# Patient Record
Sex: FEMALE | Race: WHITE | NOT HISPANIC OR LATINO | URBAN - METROPOLITAN AREA
[De-identification: names, ages, dates, MRNs, and addresses within clinical notes are randomized per-mention and may not be internally consistent; named-entity substitution may affect disease eponyms.]

---

## 2018-03-26 ENCOUNTER — APPOINTMENT (RX ONLY)
Dept: URBAN - METROPOLITAN AREA OTHER 10 | Facility: OTHER | Age: 58
Setting detail: DERMATOLOGY
End: 2018-03-26

## 2018-03-26 DIAGNOSIS — L21.8 OTHER SEBORRHEIC DERMATITIS: ICD-10-CM

## 2018-03-26 PROBLEM — L20.84 INTRINSIC (ALLERGIC) ECZEMA: Status: ACTIVE | Noted: 2018-03-26

## 2018-03-26 PROCEDURE — ? PRESCRIPTION

## 2018-03-26 PROCEDURE — ? COUNSELING

## 2018-03-26 PROCEDURE — ? TREATMENT REGIMEN

## 2018-03-26 PROCEDURE — 99202 OFFICE O/P NEW SF 15 MIN: CPT

## 2018-03-26 RX ORDER — CLOBETASOL PROPIONATE 0.46 MG/ML
SOLUTION TOPICAL
Qty: 50 | Refills: 1 | Status: ERX | COMMUNITY
Start: 2018-03-26

## 2018-03-26 RX ORDER — CICLOPIROX 1 %
SHAMPOO TOPICAL WEEKLY
Qty: 120 | Refills: 1 | Status: ERX | COMMUNITY
Start: 2018-03-26

## 2018-03-26 RX ADMIN — Medication: at 19:57

## 2018-03-26 RX ADMIN — CLOBETASOL PROPIONATE: 0.46 SOLUTION TOPICAL at 19:57

## 2018-03-26 ASSESSMENT — LOCATION ZONE DERM: LOCATION ZONE: SCALP

## 2018-03-26 ASSESSMENT — LOCATION SIMPLE DESCRIPTION DERM: LOCATION SIMPLE: POSTERIOR SCALP

## 2018-03-26 ASSESSMENT — LOCATION DETAILED DESCRIPTION DERM: LOCATION DETAILED: MID-OCCIPITAL SCALP

## 2018-03-26 NOTE — HPI: ITCHING (SCALP)
How Did The Scalp Condition Occur?: gradual in onset  (over a period of years)
How Severe Is The Scalp Condition?: moderate
Additional History: \\n\\nPatient states she itch more in the winter months.  She used otc Ketoconazole shampoo last year, and it helped, but didn’t help much this year.  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

## 2018-03-26 NOTE — PROCEDURE: TREATMENT REGIMEN
Initiate Treatment: Ciclopirox: once to twice weekly \\nClobetasol scalp solution: twice daily for two weeks
Detail Level: Zone

## 2018-04-24 ENCOUNTER — APPOINTMENT (RX ONLY)
Dept: URBAN - METROPOLITAN AREA OTHER 10 | Facility: OTHER | Age: 58
Setting detail: DERMATOLOGY
End: 2018-04-24

## 2018-04-24 DIAGNOSIS — L21.8 OTHER SEBORRHEIC DERMATITIS: ICD-10-CM

## 2018-04-24 PROCEDURE — ? PRESCRIPTION

## 2018-04-24 PROCEDURE — ? TREATMENT REGIMEN

## 2018-04-24 PROCEDURE — ? COUNSELING

## 2018-04-24 PROCEDURE — 99213 OFFICE O/P EST LOW 20 MIN: CPT

## 2018-04-24 RX ORDER — FLUCONAZOLE 200 MG/1
TABLET ORAL WEEKLY
Qty: 8 | Refills: 0 | Status: ERX | COMMUNITY
Start: 2018-04-24

## 2018-04-24 RX ORDER — FLUCONAZOLE 200 MG/1
TABLET ORAL WEEKLY
Qty: 8 | Refills: 0 | Status: CANCELLED
Stop reason: SDUPTHER

## 2018-04-24 RX ADMIN — FLUCONAZOLE: 200 TABLET ORAL at 20:19

## 2018-04-24 NOTE — PROCEDURE: TREATMENT REGIMEN
Samples Given: Tsal, Tgel, CLN, shampoo
Otc Regimen: Tsal, tgel, cln shampoo
Detail Level: Zone
Initiate Treatment: Fluconozole 200 bid